# Patient Record
Sex: FEMALE | URBAN - METROPOLITAN AREA
[De-identification: names, ages, dates, MRNs, and addresses within clinical notes are randomized per-mention and may not be internally consistent; named-entity substitution may affect disease eponyms.]

---

## 2020-01-15 ENCOUNTER — OFFICE VISIT (OUTPATIENT)
Dept: URBAN - METROPOLITAN AREA CLINIC 58 | Facility: CLINIC | Age: 40
End: 2020-01-15
Payer: COMMERCIAL

## 2020-01-15 DIAGNOSIS — H16.141 PUNCTATE KERATITIS, RIGHT EYE: Primary | ICD-10-CM

## 2020-01-15 PROCEDURE — 92004 COMPRE OPH EXAM NEW PT 1/>: CPT | Performed by: OPTOMETRIST

## 2020-01-15 ASSESSMENT — INTRAOCULAR PRESSURE
OS: 16
OD: 15

## 2020-01-15 NOTE — IMPRESSION/PLAN
Impression: Punctate keratitis, right eye: H16.141. Plan: Prednisolone ace 1gtt OD tid x 1 wk then d/c. Possibly caused by FB that is not there. Discussed diagnosis in detail with patient. Advised patient of condition.  Continue Prednisolone

## 2021-01-15 ENCOUNTER — OFFICE VISIT (OUTPATIENT)
Dept: URBAN - METROPOLITAN AREA CLINIC 58 | Facility: CLINIC | Age: 41
End: 2021-01-15
Payer: COMMERCIAL

## 2021-01-15 DIAGNOSIS — E11.9 TYPE 2 DIABETES MELLITUS W/O COMPLICATION: Primary | ICD-10-CM

## 2021-01-15 DIAGNOSIS — H04.123 DRY EYE SYNDROME OF BILATERAL LACRIMAL GLANDS: ICD-10-CM

## 2021-01-15 PROCEDURE — 99213 OFFICE O/P EST LOW 20 MIN: CPT | Performed by: OPTOMETRIST

## 2021-01-15 ASSESSMENT — INTRAOCULAR PRESSURE
OD: 16
OS: 16

## 2021-01-15 NOTE — IMPRESSION/PLAN
Impression: Type 2 diabetes mellitus w/o complication: R81.8. Plan: Diabetes type II: no background retinopathy, no signs of neovascularization noted. Discussed ocular and systemic benefits of blood sugar control.

## 2021-01-15 NOTE — IMPRESSION/PLAN
Impression: Dry eye syndrome of bilateral lacrimal glands: H04.123. Plan: Dry eyes account for the patient's complaints. There is no evidence of permanent changes to the cornea. Explained condition does not have a cure and will need artificial tears for maintenance.  Recommend Systane Complete